# Patient Record
Sex: FEMALE | Race: WHITE | NOT HISPANIC OR LATINO | URBAN - METROPOLITAN AREA
[De-identification: names, ages, dates, MRNs, and addresses within clinical notes are randomized per-mention and may not be internally consistent; named-entity substitution may affect disease eponyms.]

---

## 2018-12-21 ENCOUNTER — OUTPATIENT (OUTPATIENT)
Dept: OUTPATIENT SERVICES | Facility: HOSPITAL | Age: 18
LOS: 1 days | Discharge: HOME | End: 2018-12-21

## 2021-02-07 ENCOUNTER — EMERGENCY (EMERGENCY)
Facility: HOSPITAL | Age: 21
LOS: 1 days | Discharge: HOME | End: 2021-02-07
Attending: STUDENT IN AN ORGANIZED HEALTH CARE EDUCATION/TRAINING PROGRAM
Payer: COMMERCIAL

## 2021-02-07 VITALS
SYSTOLIC BLOOD PRESSURE: 119 MMHG | DIASTOLIC BLOOD PRESSURE: 80 MMHG | WEIGHT: 156.09 LBS | HEIGHT: 68 IN | OXYGEN SATURATION: 98 % | TEMPERATURE: 98 F | HEART RATE: 61 BPM | RESPIRATION RATE: 16 BRPM

## 2021-02-07 VITALS
RESPIRATION RATE: 16 BRPM | SYSTOLIC BLOOD PRESSURE: 121 MMHG | HEART RATE: 60 BPM | OXYGEN SATURATION: 99 % | DIASTOLIC BLOOD PRESSURE: 76 MMHG

## 2021-02-07 LAB
ALBUMIN SERPL ELPH-MCNC: 4.7 G/DL — SIGNIFICANT CHANGE UP (ref 3.3–5)
ALP SERPL-CCNC: 71 U/L — SIGNIFICANT CHANGE UP (ref 40–120)
ALT FLD-CCNC: 13 U/L — SIGNIFICANT CHANGE UP (ref 10–45)
ANION GAP SERPL CALC-SCNC: 15 MMOL/L — SIGNIFICANT CHANGE UP (ref 5–17)
AST SERPL-CCNC: 20 U/L — SIGNIFICANT CHANGE UP (ref 10–40)
BASOPHILS # BLD AUTO: 0.03 K/UL — SIGNIFICANT CHANGE UP (ref 0–0.2)
BASOPHILS NFR BLD AUTO: 0.2 % — SIGNIFICANT CHANGE UP (ref 0–2)
BILIRUB SERPL-MCNC: 1.1 MG/DL — SIGNIFICANT CHANGE UP (ref 0.2–1.2)
BUN SERPL-MCNC: 14 MG/DL — SIGNIFICANT CHANGE UP (ref 7–23)
CALCIUM SERPL-MCNC: 9.4 MG/DL — SIGNIFICANT CHANGE UP (ref 8.4–10.5)
CHLORIDE SERPL-SCNC: 101 MMOL/L — SIGNIFICANT CHANGE UP (ref 96–108)
CO2 SERPL-SCNC: 20 MMOL/L — LOW (ref 22–31)
CREAT SERPL-MCNC: 0.61 MG/DL — SIGNIFICANT CHANGE UP (ref 0.5–1.3)
EOSINOPHIL # BLD AUTO: 0 K/UL — SIGNIFICANT CHANGE UP (ref 0–0.5)
EOSINOPHIL NFR BLD AUTO: 0 % — SIGNIFICANT CHANGE UP (ref 0–6)
GLUCOSE SERPL-MCNC: 129 MG/DL — HIGH (ref 70–99)
HCT VFR BLD CALC: 39.3 % — SIGNIFICANT CHANGE UP (ref 34.5–45)
HGB BLD-MCNC: 14 G/DL — SIGNIFICANT CHANGE UP (ref 11.5–15.5)
IMM GRANULOCYTES NFR BLD AUTO: 0.5 % — SIGNIFICANT CHANGE UP (ref 0–1.5)
LIDOCAIN IGE QN: 20 U/L — SIGNIFICANT CHANGE UP (ref 7–60)
LYMPHOCYTES # BLD AUTO: 0.74 K/UL — LOW (ref 1–3.3)
LYMPHOCYTES # BLD AUTO: 5.9 % — LOW (ref 13–44)
MCHC RBC-ENTMCNC: 30.8 PG — SIGNIFICANT CHANGE UP (ref 27–34)
MCHC RBC-ENTMCNC: 35.6 GM/DL — SIGNIFICANT CHANGE UP (ref 32–36)
MCV RBC AUTO: 86.6 FL — SIGNIFICANT CHANGE UP (ref 80–100)
MONOCYTES # BLD AUTO: 0.34 K/UL — SIGNIFICANT CHANGE UP (ref 0–0.9)
MONOCYTES NFR BLD AUTO: 2.7 % — SIGNIFICANT CHANGE UP (ref 2–14)
NEUTROPHILS # BLD AUTO: 11.29 K/UL — HIGH (ref 1.8–7.4)
NEUTROPHILS NFR BLD AUTO: 90.7 % — HIGH (ref 43–77)
NRBC # BLD: 0 /100 WBCS — SIGNIFICANT CHANGE UP (ref 0–0)
PLATELET # BLD AUTO: 257 K/UL — SIGNIFICANT CHANGE UP (ref 150–400)
POTASSIUM SERPL-MCNC: 3.7 MMOL/L — SIGNIFICANT CHANGE UP (ref 3.5–5.3)
POTASSIUM SERPL-SCNC: 3.7 MMOL/L — SIGNIFICANT CHANGE UP (ref 3.5–5.3)
PROT SERPL-MCNC: 7.8 G/DL — SIGNIFICANT CHANGE UP (ref 6–8.3)
RBC # BLD: 4.54 M/UL — SIGNIFICANT CHANGE UP (ref 3.8–5.2)
RBC # FLD: 12.1 % — SIGNIFICANT CHANGE UP (ref 10.3–14.5)
SODIUM SERPL-SCNC: 136 MMOL/L — SIGNIFICANT CHANGE UP (ref 135–145)
WBC # BLD: 12.46 K/UL — HIGH (ref 3.8–10.5)
WBC # FLD AUTO: 12.46 K/UL — HIGH (ref 3.8–10.5)

## 2021-02-07 PROCEDURE — 96374 THER/PROPH/DIAG INJ IV PUSH: CPT

## 2021-02-07 PROCEDURE — 71046 X-RAY EXAM CHEST 2 VIEWS: CPT

## 2021-02-07 PROCEDURE — 96375 TX/PRO/DX INJ NEW DRUG ADDON: CPT

## 2021-02-07 PROCEDURE — 93005 ELECTROCARDIOGRAM TRACING: CPT

## 2021-02-07 PROCEDURE — 71046 X-RAY EXAM CHEST 2 VIEWS: CPT | Mod: 26

## 2021-02-07 PROCEDURE — 99285 EMERGENCY DEPT VISIT HI MDM: CPT

## 2021-02-07 PROCEDURE — 85025 COMPLETE CBC W/AUTO DIFF WBC: CPT

## 2021-02-07 PROCEDURE — 80053 COMPREHEN METABOLIC PANEL: CPT

## 2021-02-07 PROCEDURE — 99284 EMERGENCY DEPT VISIT MOD MDM: CPT | Mod: 25

## 2021-02-07 PROCEDURE — 83690 ASSAY OF LIPASE: CPT

## 2021-02-07 PROCEDURE — 93010 ELECTROCARDIOGRAM REPORT: CPT | Mod: 59

## 2021-02-07 RX ORDER — FAMOTIDINE 10 MG/ML
20 INJECTION INTRAVENOUS ONCE
Refills: 0 | Status: COMPLETED | OUTPATIENT
Start: 2021-02-07 | End: 2021-02-07

## 2021-02-07 RX ORDER — ONDANSETRON 8 MG/1
1 TABLET, FILM COATED ORAL
Qty: 3 | Refills: 0
Start: 2021-02-07 | End: 2021-02-07

## 2021-02-07 RX ORDER — ONDANSETRON 8 MG/1
4 TABLET, FILM COATED ORAL ONCE
Refills: 0 | Status: COMPLETED | OUTPATIENT
Start: 2021-02-07 | End: 2021-02-07

## 2021-02-07 RX ORDER — SODIUM CHLORIDE 9 MG/ML
1000 INJECTION INTRAMUSCULAR; INTRAVENOUS; SUBCUTANEOUS ONCE
Refills: 0 | Status: COMPLETED | OUTPATIENT
Start: 2021-02-07 | End: 2021-02-07

## 2021-02-07 RX ADMIN — FAMOTIDINE 20 MILLIGRAM(S): 10 INJECTION INTRAVENOUS at 07:45

## 2021-02-07 RX ADMIN — ONDANSETRON 4 MILLIGRAM(S): 8 TABLET, FILM COATED ORAL at 07:45

## 2021-02-07 RX ADMIN — SODIUM CHLORIDE 1000 MILLILITER(S): 9 INJECTION INTRAMUSCULAR; INTRAVENOUS; SUBCUTANEOUS at 07:45

## 2021-02-07 RX ADMIN — Medication 30 MILLILITER(S): at 08:13

## 2021-02-07 NOTE — ED PROVIDER NOTE - ATTENDING CONTRIBUTION TO CARE
I have personally performed a face to face medical and diagnostic evaluation of the patient. I have discussed with and reviewed the Resident's note and agree with the History, ROS, Physical Exam and MDM unless otherwise indicated. A brief summary of my personal evaluation and impression can be found below.    20F no pmh presents with a cc of epigastric/chest discomfort starting last night a/w repeated episodes of NBNB n/v chest discomfort started after vomiting, possible 1 other person w similar sx, also notes mild SOB a/w episodes of vomiting, no LE swelling no other abdominal pain no exertional sx, had bowel movement approx 3am was normal, LMP x2 weeks ago per baseline, no OCPs, feels improved since last night in ED. Denies /f/c/Denies headache, syncope, lightheadedness, dizziness. Denies chest palpitations, abdominal pain. Denies edema. Denies dysuria, hematuria, BRBPR, tarry stools, diarrhea, constipation.     All other ROS negative, except as above and as per HPI and ROS section.    VITALS: Initial triage and subsequent vitals have been reviewed by me.  GEN APPEARANCE: WDWN, alert, non-toxic, NAD  HEAD: Atraumatic.  EYES: PERRLa, EOMI, vision grossly intact.   NECK: Supple  CV: RRR, S1S2, no c/r/m/g. Cap refill <2 seconds. No bruits.   LUNGS: CTAB. No abnormal breath sounds.  ABDOMEN: mild epigastric ttp, no RUQ ttp,  No guarding or rebound.   MSK/EXT: No spinal or extremity point tenderness. No CVA ttp. Pelvis stable. No peripheral edema.  NEURO: Alert, follows commands. Weight bearing normal. Speech normal. Sensation and motor normal x4 extremities.   SKIN: Warm, dry and intact. No rash.  PSYCH: Appropriate    Plan/MDM: 20F presents with cc of epigastric / substernal non exertional chest discomfort in setting of repeated episodes of n/v overnight, exam w mild epigastric ttp only, appears well likely dyspepsia in setting of vomiting, will check labs lipase cxr ekg, po chal reassess likely dc thereafter. I have personally performed a face to face medical and diagnostic evaluation of the patient. I have discussed with and reviewed the Resident's note and agree with the History, ROS, Physical Exam and MDM unless otherwise indicated. A brief summary of my personal evaluation and impression can be found below.    20F no pmh presents with a cc of epigastric/chest discomfort starting last night a/w repeated episodes of NBNB n/v chest discomfort started after vomiting, possible 1 other person w similar sx, also notes mild SOB a/w episodes of vomiting, no LE swelling no other abdominal pain no exertional sx, had bowel movement approx 3am was normal, LMP x2 weeks ago per baseline, no OCPs, feels improved since last night in ED. Denies /f/c/Denies headache, syncope, lightheadedness, dizziness. Denies chest palpitations, abdominal pain. Denies edema. Denies dysuria, hematuria, BRBPR, tarry stools, diarrhea, constipation.     All other ROS negative, except as above and as per HPI and ROS section.    VITALS: Initial triage and subsequent vitals have been reviewed by me.  GEN APPEARANCE: WDWN, alert, non-toxic, NAD  HEAD: Atraumatic.  EYES: PERRLa, EOMI, vision grossly intact.   NECK: Supple  CV: RRR, S1S2, no c/r/m/g. Cap refill <2 seconds. No bruits.   LUNGS: CTAB. No abnormal breath sounds.  ABDOMEN: mild epigastric ttp, no RUQ ttp,  No guarding or rebound.   MSK/EXT: No spinal or extremity point tenderness. No CVA ttp. Pelvis stable. No peripheral edema.  NEURO: Alert, follows commands. Weight bearing normal. Speech normal. Sensation and motor normal x4 extremities.   SKIN: Warm, dry and intact. No rash.  PSYCH: Appropriate    Plan/MDM: 20F presents with cc of epigastric / substernal non exertional chest discomfort in setting of repeated episodes of n/v overnight, exam w mild epigastric ttp only, appears well likely dyspepsia/gastritis in setting of vomiting, low c/f acs as pain non exertional onset in setting of vomiting, low c/f intraabdominal or pelvic surgical pathology given well appearing and non tender abdomen, will check labs lipase cxr ekg, po chal reassess likely dc thereafter.

## 2021-02-07 NOTE — ED ADULT NURSE NOTE - OBJECTIVE STATEMENT
19 yo female presents to ED from home c/o epigastric chest pain that started after multiple episodes of vomiting yesterday. Patient reports a few days ago drinking coquito that her friend brought over 21 yo female presents to ED from home c/o epigastric chest pain that started after multiple episodes of vomiting yesterday. Patient reports a few days ago drinking coquito that her friend brought over, who also had similar sx. Patient reports mild CP after vomiting. Patient denies SOB, nvd, fever/chills, sick contacts, falls/loc. Patient A&Ox3, breathing spontaneously, airway patent, bl clear lungs, abdomen nontender, +pulses, cap refill <2 seconds. patient resting in bed, side rails up, plan of care explained.

## 2021-02-07 NOTE — ED PROVIDER NOTE - PROGRESS NOTE DETAILS
Resident Mikey: PO tolerated. Labs are non actionable. FU with primary care physician. Return precautions discussed. Finesse ACUÑA: poct upreg negative

## 2021-02-07 NOTE — ED PROVIDER NOTE - NSFOLLOWUPINSTRUCTIONS_ED_ALL_ED_FT
No signs of emergency medical condition on today's workup. Your labs and EKG copy is attached to your discharge papers. Presumptive diagnosis made, but further evaluation may be required by your primary care doctor or specialist for a definitive diagnosis.  Therefore, follow up as directed and if symptoms change/worsen or any emergency conditions, please return to the ER.  For pain or fever you can ibuprofen (motrin, advil) or tylenol as needed, as directed on packaging.   If needed, call patient access services at [phone] to find a primary care doctor, or call at [phone] to make an appointment at the clinic.

## 2021-02-07 NOTE — ED PROVIDER NOTE - NS ED ROS FT
Gen: Denies fever, chills  CV: +chest discomfort, resolved now  Skin: Denies rash, erythema  Resp: Denies SOB, cough  ENT: Denies nasal congestion  Eyes: Denies blurry vision  GI: +abd pain, nausea, vomiting, denies diarrhea  Msk: Denies LE swelling  : Denies dysuria  Neuro: Denies headache

## 2021-02-07 NOTE — ED PROVIDER NOTE - PHYSICAL EXAMINATION
Gen: well developed and well nourished, NAD  Head: NC/NT  Eyes: PERRL, EOMI   ENT: airway patent, mmm  CV: RRR, +S1/S2   Resp: CTAB, symmetric breath sounds   GI:   abdomen soft non-distended, +mild epigastric TTP   Back: no CVA tenderness  Extremities - no edema   Neuro: A&Ox3, following commands, speech clear, moving all four extremities spontaneously  Psych: appropriate mood, normal insight

## 2021-02-07 NOTE — ED PROVIDER NOTE - PATIENT PORTAL LINK FT
You can access the FollowMyHealth Patient Portal offered by [facility] by registering at the following website: [email or web address]. By joining [employer]’s FollowMyHealth portal, you will also be able to view your health information using other applications (apps) compatible with our system.

## 2021-02-07 NOTE — ED PROVIDER NOTE - CLINICAL SUMMARY MEDICAL DECISION MAKING FREE TEXT BOX
Presents with n/d/v, now resolved. Mild epigastric ttp. VSS. Likely gastroenteritis. EKG without larry, std. Check electrolytes. Preg test. Antiemetic.

## 2021-02-07 NOTE — ED PROVIDER NOTE - NSFOLLOWUPCLINICS_GEN_ALL_ED_FT
[facility]  Cardiology  [address] [place] [id number]  Phone: [phone]  Fax:   Follow Up Time:

## 2021-02-07 NOTE — ED PROVIDER NOTE - OBJECTIVE STATEMENT
21 yo F with no pmhx presents with multiple episodes non bloody vomiting and diarrhea since last night,  last episode was at 530a today. Started having chest discomfort after few episodes of vomiting that is now resolved. Denies cough, headache, nasal congestion, f/c. States she had a milk based drink with her friend who had similar sxs as well. Denies drinking alcohol. LMP 01/23

## 2021-02-08 RX ORDER — ONDANSETRON 8 MG/1
1 TABLET, FILM COATED ORAL
Qty: 5 | Refills: 0
Start: 2021-02-08 | End: 2021-02-09

## 2022-03-18 ENCOUNTER — RESULT REVIEW (OUTPATIENT)
Age: 22
End: 2022-03-18

## 2022-12-14 PROBLEM — Z00.00 ENCOUNTER FOR PREVENTIVE HEALTH EXAMINATION: Status: ACTIVE | Noted: 2022-12-14

## 2022-12-21 ENCOUNTER — APPOINTMENT (OUTPATIENT)
Dept: OBGYN | Facility: CLINIC | Age: 22
End: 2022-12-21

## 2023-01-10 ENCOUNTER — APPOINTMENT (OUTPATIENT)
Dept: OBGYN | Facility: CLINIC | Age: 23
End: 2023-01-10
Payer: COMMERCIAL

## 2023-01-10 VITALS
HEIGHT: 68 IN | DIASTOLIC BLOOD PRESSURE: 80 MMHG | WEIGHT: 164 LBS | SYSTOLIC BLOOD PRESSURE: 124 MMHG | BODY MASS INDEX: 24.86 KG/M2

## 2023-01-10 DIAGNOSIS — Z13.21 ENCOUNTER FOR SCREENING FOR NUTRITIONAL DISORDER: ICD-10-CM

## 2023-01-10 DIAGNOSIS — Z13.88 ENCOUNTER FOR SCREENING FOR DISORDER DUE TO EXPOSURE TO CONTAMINANTS: ICD-10-CM

## 2023-01-10 DIAGNOSIS — R11.0 NAUSEA: ICD-10-CM

## 2023-01-10 DIAGNOSIS — Z32.01 ENCOUNTER FOR PREGNANCY TEST, RESULT POSITIVE: ICD-10-CM

## 2023-01-10 DIAGNOSIS — Z01.84 ENCOUNTER FOR ANTIBODY RESPONSE EXAMINATION: ICD-10-CM

## 2023-01-10 DIAGNOSIS — N91.1 SECONDARY AMENORRHEA: ICD-10-CM

## 2023-01-10 DIAGNOSIS — Z13.0 ENCOUNTER FOR SCREENING FOR DISEASES OF THE BLOOD AND BLOOD-FORMING ORGANS AND CERTAIN DISORDERS INVOLVING THE IMMUNE MECHANISM: ICD-10-CM

## 2023-01-10 DIAGNOSIS — Z12.39 ENCOUNTER FOR OTHER SCREENING FOR MALIGNANT NEOPLASM OF BREAST: ICD-10-CM

## 2023-01-10 DIAGNOSIS — N64.4 MASTODYNIA: ICD-10-CM

## 2023-01-10 DIAGNOSIS — Z13.29 ENCOUNTER FOR SCREENING FOR OTHER SUSPECTED ENDOCRINE DISORDER: ICD-10-CM

## 2023-01-10 DIAGNOSIS — Z78.9 OTHER SPECIFIED HEALTH STATUS: ICD-10-CM

## 2023-01-10 PROCEDURE — 76817 TRANSVAGINAL US OBSTETRIC: CPT

## 2023-01-10 PROCEDURE — 36415 COLL VENOUS BLD VENIPUNCTURE: CPT

## 2023-01-10 PROCEDURE — 99203 OFFICE O/P NEW LOW 30 MIN: CPT | Mod: 25

## 2023-01-10 NOTE — PLAN
[FreeTextEntry1] : SECONDARY AMENORRHEA/EARLY IUP\par -TVUS today shows early IUP measuring 8w6d c/w LMP of 11/9/22; Approx HALEIGH of 8/16/2023 \par - Early pregnancy precautions, practice /hospital info and folder provided to patient.\par  -NOB blood panel, GCC cx, UCX,  collected and sent at todays office visit. \par -Cont PNVs\par \par GENETICS\par -Ravi expanded carrier screen drawn and sent at todays visit\par \par  NAUSEA \par -small frequent meals advised -discussed Diclegis use if needed; refused, pt able to tolerate po foods and fluids at this time \par \par RTO 3-4 weeks for NOB/NT sono and NIPS. \par During this visit 40 minutes were spent face-to-face with greater than 50% of the time dedicated to counseling.\par \par

## 2023-01-10 NOTE — PROCEDURE
[GC Chlamydia Culture] : GC Chlamydia Culture [Tolerated Well] : the patient tolerated the procedure well [No Complications] : there were no complications [Transvaginal OB Sonogram] : Transvaginal OB Sonogram [Intrauterine Pregnancy] : intrauterine pregnancy [Yolk Sac] : yolk sac present [Fetal Heart] : fetal heart present [Date: ___] : Date: [unfilled] [Current GA by Sonogram: ___ (wks)] : Current GA by Sonogram: [unfilled]Uwks [___ day(s)] : [unfilled] days

## 2023-01-10 NOTE — COUNSELING
[Nutrition/ Exercise/ Weight Management] : nutrition, exercise, weight management [Vitamins/Supplements] : vitamins/supplements [Drugs/Alcohol] : drugs, alcohol [Breast Self Exam] : breast self exam [Bladder Hygiene] : bladder hygiene [Contraception/ Emergency Contraception/ Safe Sexual Practices] : contraception, emergency contraception, safe sexual practices [Confidentiality] : confidentiality [STD (testing, results, tx)] : STD (testing, results, tx) [Lab Results] : lab results [Medication Management] : medication management [Other ___] : [unfilled]

## 2023-01-10 NOTE — HISTORY OF PRESENT ILLNESS
[Patient reported PAP Smear was normal] : Patient reported PAP Smear was normal [PapSmeardate] : 3/2022 [Normal Amount/Duration] :  normal amount and duration [Regular Cycle Intervals] : periods have been regular [FreeTextEntry1] : 11/9/22

## 2023-01-10 NOTE — PHYSICAL EXAM
[Chaperone Present] : A chaperone was present in the examining room during all aspects of the physical examination [Appropriately responsive] : appropriately responsive [Alert] : alert [No Acute Distress] : no acute distress [Soft] : soft [Non-tender] : non-tender [Non-distended] : non-distended [No HSM] : No HSM [No Lesions] : no lesions [No Mass] : no mass [Oriented x3] : oriented x3 [FreeTextEntry1] : FOB present for h&p/exam with pt consent [Examination Of The Breasts] : a normal appearance [No Masses] : no breast masses were palpable [Labia Majora] : normal [Labia Minora] : normal [Normal] : normal [Uterine Adnexae] : normal

## 2023-01-11 LAB
ABO + RH PNL BLD: NORMAL
BASOPHILS # BLD AUTO: 0.05 K/UL
BASOPHILS NFR BLD AUTO: 0.4 %
BLD GP AB SCN SERPL QL: NORMAL
C TRACH RRNA SPEC QL NAA+PROBE: NOT DETECTED
EOSINOPHIL # BLD AUTO: 0.34 K/UL
EOSINOPHIL NFR BLD AUTO: 3 %
ESTIMATED AVERAGE GLUCOSE: 94 MG/DL
HBA1C MFR BLD HPLC: 4.9 %
HBV SURFACE AG SER QL: NONREACTIVE
HCT VFR BLD CALC: 42.8 %
HCV AB SER QL: NONREACTIVE
HCV S/CO RATIO: 0.06 S/CO
HGB A MFR BLD: 97.2 %
HGB A2 MFR BLD: 2.2 %
HGB BLD-MCNC: 14.3 G/DL
HGB F MFR BLD: 0.6 %
HGB FRACT BLD-IMP: NORMAL
HIV1+2 AB SPEC QL IA.RAPID: NONREACTIVE
IMM GRANULOCYTES NFR BLD AUTO: 0.4 %
LYMPHOCYTES # BLD AUTO: 2.12 K/UL
LYMPHOCYTES NFR BLD AUTO: 18.7 %
MAN DIFF?: NORMAL
MCHC RBC-ENTMCNC: 30.5 PG
MCHC RBC-ENTMCNC: 33.4 GM/DL
MCV RBC AUTO: 91.3 FL
MEV IGG FLD QL IA: >300 AU/ML
MEV IGG+IGM SER-IMP: POSITIVE
MONOCYTES # BLD AUTO: 0.82 K/UL
MONOCYTES NFR BLD AUTO: 7.3 %
MUV AB SER-ACNC: POSITIVE
MUV IGG SER QL IA: 130 AU/ML
N GONORRHOEA RRNA SPEC QL NAA+PROBE: NOT DETECTED
NEUTROPHILS # BLD AUTO: 7.93 K/UL
NEUTROPHILS NFR BLD AUTO: 70.2 %
PLATELET # BLD AUTO: 272 K/UL
RBC # BLD: 4.69 M/UL
RBC # FLD: 13 %
RUBV IGG FLD-ACNC: 1.9 INDEX
RUBV IGG SER-IMP: POSITIVE
SOURCE AMPLIFICATION: NORMAL
T PALLIDUM AB SER QL IA: NEGATIVE
TSH SERPL-ACNC: 1.31 UIU/ML
VZV AB TITR SER: POSITIVE
VZV IGG SER IF-ACNC: 279 INDEX
WBC # FLD AUTO: 11.31 K/UL

## 2023-01-12 ENCOUNTER — NON-APPOINTMENT (OUTPATIENT)
Age: 23
End: 2023-01-12

## 2023-01-12 DIAGNOSIS — O23.40 UNSPECIFIED INFECTION OF URINARY TRACT IN PREGNANCY, UNSPECIFIED TRIMESTER: ICD-10-CM

## 2023-01-12 LAB
25(OH)D3 SERPL-MCNC: 24.3 NG/ML
LEAD BLD-MCNC: <1 UG/DL

## 2023-01-13 ENCOUNTER — NON-APPOINTMENT (OUTPATIENT)
Age: 23
End: 2023-01-13

## 2023-01-13 LAB
B19V IGG SER QL IA: 0.44 INDEX
B19V IGG+IGM SER-IMP: NEGATIVE
B19V IGG+IGM SER-IMP: NORMAL
B19V IGM FLD-ACNC: 0.09 INDEX
B19V IGM SER-ACNC: NEGATIVE

## 2023-01-27 ENCOUNTER — TRANSCRIPTION ENCOUNTER (OUTPATIENT)
Age: 23
End: 2023-01-27

## 2023-02-07 ENCOUNTER — APPOINTMENT (OUTPATIENT)
Dept: OBGYN | Facility: CLINIC | Age: 23
End: 2023-02-07

## 2023-02-08 ENCOUNTER — APPOINTMENT (OUTPATIENT)
Dept: OBGYN | Facility: CLINIC | Age: 23
End: 2023-02-08

## 2023-02-13 ENCOUNTER — NON-APPOINTMENT (OUTPATIENT)
Age: 23
End: 2023-02-13

## 2023-07-03 ENCOUNTER — APPOINTMENT (OUTPATIENT)
Dept: PEDIATRIC CARDIOLOGY | Facility: CLINIC | Age: 23
End: 2023-07-03
Payer: COMMERCIAL

## 2023-07-03 PROCEDURE — 93325 DOPPLER ECHO COLOR FLOW MAPG: CPT | Mod: 59

## 2023-07-03 PROCEDURE — 76825 ECHO EXAM OF FETAL HEART: CPT

## 2023-07-03 PROCEDURE — 76821 MIDDLE CEREBRAL ARTERY ECHO: CPT

## 2023-07-03 PROCEDURE — 76820 UMBILICAL ARTERY ECHO: CPT

## 2023-07-03 PROCEDURE — 76827 ECHO EXAM OF FETAL HEART: CPT

## 2023-07-03 PROCEDURE — 99203 OFFICE O/P NEW LOW 30 MIN: CPT | Mod: 25

## 2024-04-17 ENCOUNTER — APPOINTMENT (OUTPATIENT)
Dept: INTERNAL MEDICINE | Facility: CLINIC | Age: 24
End: 2024-04-17
Payer: COMMERCIAL

## 2024-04-17 VITALS
WEIGHT: 160 LBS | RESPIRATION RATE: 16 BRPM | DIASTOLIC BLOOD PRESSURE: 84 MMHG | OXYGEN SATURATION: 98 % | HEIGHT: 68 IN | HEART RATE: 69 BPM | SYSTOLIC BLOOD PRESSURE: 118 MMHG | TEMPERATURE: 97.6 F | BODY MASS INDEX: 24.25 KG/M2

## 2024-04-17 DIAGNOSIS — Z11.3 ENCOUNTER FOR SCREENING FOR INFECTIONS WITH A PREDOMINANTLY SEXUAL MODE OF TRANSMISSION: ICD-10-CM

## 2024-04-17 DIAGNOSIS — Z00.00 ENCOUNTER FOR GENERAL ADULT MEDICAL EXAMINATION W/OUT ABNORMAL FINDINGS: ICD-10-CM

## 2024-04-17 DIAGNOSIS — Z82.49 FAMILY HISTORY OF ISCHEMIC HEART DISEASE AND OTHER DISEASES OF THE CIRCULATORY SYSTEM: ICD-10-CM

## 2024-04-17 PROCEDURE — G0444 DEPRESSION SCREEN ANNUAL: CPT | Mod: 59

## 2024-04-17 PROCEDURE — 99385 PREV VISIT NEW AGE 18-39: CPT

## 2024-04-17 RX ORDER — AMPICILLIN 500 MG/1
500 CAPSULE ORAL 3 TIMES DAILY
Qty: 21 | Refills: 0 | Status: DISCONTINUED | COMMUNITY
Start: 2023-01-13 | End: 2024-04-17

## 2024-04-18 NOTE — HISTORY OF PRESENT ILLNESS
[de-identified] : Ms. FRANCI VALDIVIA is a 23-year-old female who is 7 months postpartum with no significant medical hx., who presents to establish care and for an annual physical exam. Pt. requested an STD test  Pt. states she is feeling well. Offers no complaints.

## 2024-04-18 NOTE — HEALTH RISK ASSESSMENT
[Very Good] : ~his/her~ current health as very good [Good] : ~his/her~  mood as  good [No] : No [0] : 2) Feeling down, depressed, or hopeless: Not at all (0) [PHQ-2 Negative - No further assessment needed] : PHQ-2 Negative - No further assessment needed [Patient reported PAP Smear was normal] : Patient reported PAP Smear was normal [With Family] : lives with family [# of Members in Household ___] :  household currently consist of [unfilled] member(s) [Employed] : employed [College] : College [Single] : single [# Of Children ___] : has [unfilled] children [Feels Safe at Home] : Feels safe at home [Never] : Never [de-identified] : Maintains active by working out once to three times a week.  [de-identified] : Maintains a healthy diet.  [[id number]] : 0 [PapSmearDate] : 08/2023

## 2024-04-18 NOTE — PLAN
[FreeTextEntry1] : See plan.  Routine STI screening Bloodwork ordered. Pt. instructed to follow up in one week for lab results.

## 2024-04-25 ENCOUNTER — APPOINTMENT (OUTPATIENT)
Dept: INTERNAL MEDICINE | Facility: CLINIC | Age: 24
End: 2024-04-25

## 2024-04-26 LAB
25(OH)D3 SERPL-MCNC: 41.5 NG/ML
ALBUMIN SERPL ELPH-MCNC: 4.8 G/DL
ALP BLD-CCNC: 100 U/L
ALT SERPL-CCNC: 11 U/L
ANION GAP SERPL CALC-SCNC: 15 MMOL/L
APPEARANCE: CLEAR
AST SERPL-CCNC: 19 U/L
BASOPHILS # BLD AUTO: 0.05 K/UL
BASOPHILS NFR BLD AUTO: 0.7 %
BILIRUB SERPL-MCNC: 0.5 MG/DL
BILIRUBIN URINE: NEGATIVE
BLOOD URINE: NEGATIVE
BUN SERPL-MCNC: 18 MG/DL
C TRACH RRNA SPEC QL NAA+PROBE: NOT DETECTED
CALCIUM SERPL-MCNC: 10.1 MG/DL
CHLORIDE SERPL-SCNC: 100 MMOL/L
CHOLEST SERPL-MCNC: 229 MG/DL
CO2 SERPL-SCNC: 23 MMOL/L
COLOR: YELLOW
CREAT SERPL-MCNC: 0.68 MG/DL
EGFR: 124 ML/MIN/1.73M2
EOSINOPHIL # BLD AUTO: 0.27 K/UL
EOSINOPHIL NFR BLD AUTO: 3.7 %
ESTIMATED AVERAGE GLUCOSE: 97 MG/DL
FERRITIN SERPL-MCNC: 73 NG/ML
GLUCOSE QUALITATIVE U: NEGATIVE MG/DL
GLUCOSE SERPL-MCNC: 77 MG/DL
HAV IGM SER QL: NONREACTIVE
HBA1C MFR BLD HPLC: 5 %
HBV CORE IGM SER QL: NONREACTIVE
HBV SURFACE AB SER QL: REACTIVE
HBV SURFACE AG SER QL: NONREACTIVE
HCT VFR BLD CALC: 43.7 %
HCV AB SER QL: NONREACTIVE
HCV S/CO RATIO: 0.08 S/CO
HDLC SERPL-MCNC: 78 MG/DL
HGB BLD-MCNC: 14.7 G/DL
HSV 1+2 IGG SER IA-IMP: NEGATIVE
HSV 1+2 IGG SER IA-IMP: NEGATIVE
HSV1 IGG SER QL: <0.01 INDEX
HSV2 IGG SER QL: 0.18 INDEX
IMM GRANULOCYTES NFR BLD AUTO: 0.4 %
IRON SATN MFR SERPL: 22 %
IRON SERPL-MCNC: 73 UG/DL
KETONES URINE: NEGATIVE MG/DL
LDLC SERPL CALC-MCNC: 137 MG/DL
LEUKOCYTE ESTERASE URINE: NEGATIVE
LYMPHOCYTES # BLD AUTO: 2.69 K/UL
LYMPHOCYTES NFR BLD AUTO: 36.7 %
MAN DIFF?: NORMAL
MCHC RBC-ENTMCNC: 29.2 PG
MCHC RBC-ENTMCNC: 33.6 GM/DL
MCV RBC AUTO: 86.7 FL
MONOCYTES # BLD AUTO: 0.55 K/UL
MONOCYTES NFR BLD AUTO: 7.5 %
N GONORRHOEA RRNA SPEC QL NAA+PROBE: NOT DETECTED
NEUTROPHILS # BLD AUTO: 3.74 K/UL
NEUTROPHILS NFR BLD AUTO: 51 %
NITRITE URINE: NEGATIVE
NONHDLC SERPL-MCNC: 152 MG/DL
PH URINE: 7
PLATELET # BLD AUTO: 269 K/UL
POTASSIUM SERPL-SCNC: 3.9 MMOL/L
PROT SERPL-MCNC: 7.7 G/DL
PROTEIN URINE: NEGATIVE MG/DL
RBC # BLD: 5.04 M/UL
RBC # FLD: 12.9 %
SODIUM SERPL-SCNC: 137 MMOL/L
SOURCE AMPLIFICATION: NORMAL
SOURCE AMPLIFICATION: NORMAL
SPECIFIC GRAVITY URINE: 1.02
T PALLIDUM AB SER QL IA: NEGATIVE
TIBC SERPL-MCNC: 331 UG/DL
TRIGL SERPL-MCNC: 85 MG/DL
TSH SERPL-ACNC: 1.45 UIU/ML
UIBC SERPL-MCNC: 258 UG/DL
UROBILINOGEN URINE: 0.2 MG/DL
VIT B12 SERPL-MCNC: 931 PG/ML
WBC # FLD AUTO: 7.33 K/UL

## 2024-05-01 LAB — HIV1+2 AB SPEC QL IA.RAPID: NONREACTIVE

## 2025-06-03 ENCOUNTER — APPOINTMENT (OUTPATIENT)
Age: 25
End: 2025-06-03